# Patient Record
Sex: FEMALE | Race: BLACK OR AFRICAN AMERICAN | NOT HISPANIC OR LATINO | Employment: FULL TIME | URBAN - METROPOLITAN AREA
[De-identification: names, ages, dates, MRNs, and addresses within clinical notes are randomized per-mention and may not be internally consistent; named-entity substitution may affect disease eponyms.]

---

## 2024-07-03 ENCOUNTER — OFFICE VISIT (OUTPATIENT)
Dept: FAMILY MEDICINE | Facility: CLINIC | Age: 29
End: 2024-07-03
Payer: COMMERCIAL

## 2024-07-03 VITALS
HEART RATE: 116 BPM | RESPIRATION RATE: 23 BRPM | SYSTOLIC BLOOD PRESSURE: 141 MMHG | DIASTOLIC BLOOD PRESSURE: 91 MMHG | OXYGEN SATURATION: 100 % | TEMPERATURE: 100.4 F

## 2024-07-03 DIAGNOSIS — H60.393 INFECTIVE OTITIS EXTERNA, BILATERAL: Primary | ICD-10-CM

## 2024-07-03 PROCEDURE — 99203 OFFICE O/P NEW LOW 30 MIN: CPT | Performed by: PHYSICIAN ASSISTANT

## 2024-07-03 RX ORDER — CIPROFLOXACIN 500 MG/1
500 TABLET, FILM COATED ORAL 2 TIMES DAILY
Qty: 14 TABLET | Refills: 0 | Status: SHIPPED | OUTPATIENT
Start: 2024-07-03 | End: 2024-07-10

## 2024-07-03 NOTE — PATIENT INSTRUCTIONS
1.  Keep ears completely dry during the time that you are on treatment. No swimming. Showering is ok, but keep ears away from the water as much as possible.   2.  Follow-up if no improvement in pain over the course the next 4 days.  Follow-up sooner if worsening symptoms.   3.  May alternate between taking 1000 mg of Tylenol and 600 mg of Ibuprofen every 3 hours for pain relief.

## 2024-07-03 NOTE — PROGRESS NOTES
"Patient presents with:  Otitis Media: X 2 days, has fever, came from [facility] and they said \"left ear is closed\" and left ear is draining, opening mouth causes some discomfort.       Clinical Decision Making:  Signs and symptoms and physical exam are most consistent with otitis externa bilaterally.  Patient's severity of swelling on left is not allowing for ear wick therefore I have chosen oral antibiotic for treatment.  Patient started on ciprofloxacin.      ICD-10-CM    1. Infective otitis externa, bilateral  H60.393 ciprofloxacin (CIPRO) 500 MG tablet          Patient Instructions   1.  Keep ears completely dry during the time that you are on treatment. No swimming. Showering is ok, but keep ears away from the water as much as possible.   2.  Follow-up if no improvement in pain over the course the next 4 days.  Follow-up sooner if worsening symptoms.   3.  May alternate between taking 1000 mg of Tylenol and 600 mg of Ibuprofen every 3 hours for pain relief.       HPI:  Lilibeth Morgan is a 29 year old female who presents today with concerns of bilateral ear pain. Left worsen than right.  Patient was just seen in [facility] clinic about 30 minutes ago and she was directed here due to the swelling in the left ear canal.    History obtained from the patient.    Problem List:  There are no relevant problems documented for this patient.      No past medical history on file.    Social History     Tobacco Use    Smoking status: Not on file    Smokeless tobacco: Not on file   Substance Use Topics    Alcohol use: Not on file         Review of Systems    Vitals:    07/03/24 1205   BP: (!) 141/91   BP Location: Right arm   Patient Position: Sitting   Cuff Size: Adult Regular   Pulse: 116   Resp: 23   Temp: 100.4  F (38  C)   TempSrc: Oral   SpO2: 100%       Physical Exam  Vitals and nursing note reviewed.   Constitutional:       General: She is not in acute distress.     Appearance: She is not toxic-appearing or " diaphoretic.   HENT:      Head: Normocephalic and atraumatic.      Comments: Significant swelling in the left ear canal with clear and white discharge coming from the ear canal.  Unable to visualize the tympanic membrane due to the severity of swelling.  Right TM is normal, but there is some discharge and swelling in the right ear canal not as severe as left.     Right Ear: External ear normal.      Left Ear: External ear normal.   Eyes:      Conjunctiva/sclera: Conjunctivae normal.   Pulmonary:      Effort: Pulmonary effort is normal. No respiratory distress.   Neurological:      Mental Status: She is alert.   Psychiatric:         Mood and Affect: Mood normal.         Behavior: Behavior normal.         Thought Content: Thought content normal.         Judgment: Judgment normal.       At the end of the encounter, I discussed results, diagnosis, medications. Discussed red flags for immediate return to clinic/ER, as well as indications for follow up if no improvement. Patient understood and agreed to plan. Patient was stable for discharge.

## 2024-07-06 ENCOUNTER — NURSE TRIAGE (OUTPATIENT)
Dept: NURSING | Facility: CLINIC | Age: 29
End: 2024-07-06
Payer: COMMERCIAL

## 2024-07-07 NOTE — TELEPHONE ENCOUNTER
Caller:   Patient    Situation:   Possible reaction to Cipro    Face is swollen, cough, shortness of breath and runny nose    3:30 pm last dose    Took 24-hr antihistamine at 3am -- says had been feeling off since then - says symptoms have worsened and that why she is calling.      Background:  Started cipro for ear infection    Assessment:  Needs to be sen        Recommendation:  Disposition: Go to ED; advised to call 911 if she is unable to get herself there    Reviewed care advise with caller. Informed to call back w/ any questions or new concerns.  Caller verbalized understanding of care advice.        Delilah Peter RN, BSN  Triage Nurse Advisor      Reason for Disposition   [1] Widespread hives, itching or facial swelling AND [2] onset < 2 hours of exposure to high-risk allergen (e.g., sting, nuts, 1st dose of antibiotic)    Additional Information   Negative: [1] Life-threatening reaction in the past to similar substance (e.g., food, insect bite/sting, medication, etc.) AND [2] < 2 hours since exposure   Negative: Wheezing, stridor, hoarseness, or difficulty breathing   Negative: [1] Tightness in the chest or throat AND [2] begins within 2 hours of exposure to allergic substance   Negative: Difficulty swallowing, drooling or slurred speech   Negative: Difficult to awaken or acting confused (e.g., disoriented, slurred speech)   Negative: Unresponsive, passed out or very weak   Negative: Other symptom of severe allergic reaction  (Exception: Hives or facial swelling alone.)   Negative: Sounds like a life-threatening emergency to the triager   Negative: [1] Widespread hives AND [2] onset > 2 hours after exposure to high-risk allergen (e.g., sting, nuts, 1st dose of antibiotic)   Negative: [1] Widespread itching AND [2] onset > 2 hours after exposure to high-risk allergen (e.g., sting, nuts, 1st dose of antibiotic)   Negative: [1] Face swelling AND [2] onset > 2 hours after exposure to high-risk allergen (e.g.,  sting, nuts, 1st dose of antibiotic)    Protocols used: [id number]

## 2024-07-28 ENCOUNTER — HEALTH MAINTENANCE LETTER (OUTPATIENT)
Age: 29
End: 2024-07-28

## 2025-08-10 ENCOUNTER — HEALTH MAINTENANCE LETTER (OUTPATIENT)
Age: 30
End: 2025-08-10